# Patient Record
Sex: FEMALE | Race: WHITE
[De-identification: names, ages, dates, MRNs, and addresses within clinical notes are randomized per-mention and may not be internally consistent; named-entity substitution may affect disease eponyms.]

---

## 2019-12-31 ENCOUNTER — HOSPITAL ENCOUNTER (EMERGENCY)
Dept: HOSPITAL 4 - SED | Age: 79
Discharge: HOME | End: 2019-12-31
Payer: COMMERCIAL

## 2019-12-31 VITALS — SYSTOLIC BLOOD PRESSURE: 131 MMHG

## 2019-12-31 VITALS — WEIGHT: 150 LBS | HEIGHT: 64 IN | BODY MASS INDEX: 25.61 KG/M2

## 2019-12-31 VITALS — SYSTOLIC BLOOD PRESSURE: 158 MMHG

## 2019-12-31 DIAGNOSIS — Z90.49: ICD-10-CM

## 2019-12-31 DIAGNOSIS — Z90.710: ICD-10-CM

## 2019-12-31 DIAGNOSIS — R10.32: ICD-10-CM

## 2019-12-31 DIAGNOSIS — R19.7: Primary | ICD-10-CM

## 2019-12-31 DIAGNOSIS — I10: ICD-10-CM

## 2019-12-31 LAB
ALBUMIN SERPL BCP-MCNC: 2.7 G/DL (ref 3.4–4.8)
ALT SERPL W P-5'-P-CCNC: 38 U/L (ref 12–78)
ANION GAP SERPL CALCULATED.3IONS-SCNC: 6 MMOL/L (ref 5–15)
AST SERPL W P-5'-P-CCNC: 24 U/L (ref 10–37)
BASOPHILS # BLD AUTO: 0.1 K/UL (ref 0–0.2)
BASOPHILS NFR BLD AUTO: 0.7 % (ref 0–2)
BILIRUB SERPL-MCNC: 0.9 MG/DL (ref 0–1)
BUN SERPL-MCNC: 18 MG/DL (ref 8–21)
CALCIUM SERPL-MCNC: 8.6 MG/DL (ref 8.4–11)
CHLORIDE SERPL-SCNC: 108 MMOL/L (ref 98–107)
CREAT SERPL-MCNC: 1.37 MG/DL (ref 0.55–1.3)
EOSINOPHIL # BLD AUTO: 0 K/UL (ref 0–0.4)
EOSINOPHIL NFR BLD AUTO: 0.3 % (ref 0–4)
ERYTHROCYTE [DISTWIDTH] IN BLOOD BY AUTOMATED COUNT: 12.8 % (ref 9–15)
GFR SERPL CREATININE-BSD FRML MDRD: (no result) ML/MIN (ref 90–?)
GLUCOSE SERPL-MCNC: 112 MG/DL (ref 70–99)
HCT VFR BLD AUTO: 37.5 % (ref 36–48)
HGB BLD-MCNC: 12.7 G/DL (ref 12–16)
LIPASE SERPL-CCNC: 113 U/L (ref 73–393)
LYMPHOCYTES # BLD AUTO: 2 K/UL (ref 1–5.5)
LYMPHOCYTES NFR BLD AUTO: 17.3 % (ref 20.5–51.5)
MCH RBC QN AUTO: 31 PG (ref 27–31)
MCHC RBC AUTO-ENTMCNC: 34 % (ref 32–36)
MCV RBC AUTO: 92 FL (ref 79–98)
MONOCYTES # BLD MANUAL: 0.9 K/UL (ref 0–1)
MONOCYTES # BLD MANUAL: 8.1 % (ref 1.7–9.3)
NEUTROPHILS # BLD AUTO: 8.4 K/UL (ref 1.8–7.7)
NEUTROPHILS NFR BLD AUTO: 73.6 % (ref 40–70)
PLATELET # BLD AUTO: 106 K/UL (ref 130–430)
POTASSIUM SERPL-SCNC: 3.8 MMOL/L (ref 3.5–5.1)
RBC # BLD AUTO: 4.08 MIL/UL (ref 4.2–6.2)
SODIUM SERPLBLD-SCNC: 138 MMOL/L (ref 136–145)
WBC # BLD AUTO: 11.4 K/UL (ref 4.8–10.8)

## 2019-12-31 PROCEDURE — 36415 COLL VENOUS BLD VENIPUNCTURE: CPT

## 2019-12-31 PROCEDURE — 99284 EMERGENCY DEPT VISIT MOD MDM: CPT

## 2019-12-31 PROCEDURE — 83690 ASSAY OF LIPASE: CPT

## 2019-12-31 PROCEDURE — 74176 CT ABD & PELVIS W/O CONTRAST: CPT

## 2019-12-31 PROCEDURE — 80053 COMPREHEN METABOLIC PANEL: CPT

## 2019-12-31 PROCEDURE — 85025 COMPLETE CBC W/AUTO DIFF WBC: CPT

## 2019-12-31 PROCEDURE — 96361 HYDRATE IV INFUSION ADD-ON: CPT

## 2019-12-31 PROCEDURE — 96374 THER/PROPH/DIAG INJ IV PUSH: CPT

## 2019-12-31 NOTE — NUR
Pt brought by self,A&Ox4,pt presents to ER with abdominal pain and diarrhea x 1 
day,skin pink and warm, cap refill <3, VSS, respirations even and unlabored, no 
vomiting noted, will cont to monitor

## 2019-12-31 NOTE — NUR
Patient and Son given written and verbal discharge instructions and verbalizes 
understanding.  ER MD discussed with patient the results and treatment 
provided. Patient in stable condition. ID arm band removed. IV catheter removed 
intact and dressing applied, no active bleeding.

Rx of immodium, motrin, Cirpo given. Patient educated on pain management and to 
follow up with PMD. Pain Scale 0/10.

Opportunity for questions provided and answered. Medication side effect fact 
sheet provided.

## 2021-04-08 ENCOUNTER — HOSPITAL ENCOUNTER (INPATIENT)
Dept: HOSPITAL 4 - SED | Age: 81
LOS: 2 days | Discharge: HOME HEALTH | DRG: 180 | End: 2021-04-10
Attending: INTERNAL MEDICINE | Admitting: INTERNAL MEDICINE
Payer: COMMERCIAL

## 2021-04-08 VITALS — HEIGHT: 64 IN | BODY MASS INDEX: 26.63 KG/M2 | WEIGHT: 156 LBS

## 2021-04-08 VITALS — SYSTOLIC BLOOD PRESSURE: 169 MMHG

## 2021-04-08 VITALS — SYSTOLIC BLOOD PRESSURE: 170 MMHG

## 2021-04-08 DIAGNOSIS — E03.9: ICD-10-CM

## 2021-04-08 DIAGNOSIS — Z85.118: ICD-10-CM

## 2021-04-08 DIAGNOSIS — Z87.891: ICD-10-CM

## 2021-04-08 DIAGNOSIS — J96.01: ICD-10-CM

## 2021-04-08 DIAGNOSIS — J90: ICD-10-CM

## 2021-04-08 DIAGNOSIS — E78.5: ICD-10-CM

## 2021-04-08 DIAGNOSIS — Z85.3: ICD-10-CM

## 2021-04-08 DIAGNOSIS — Z82.49: ICD-10-CM

## 2021-04-08 DIAGNOSIS — M81.0: ICD-10-CM

## 2021-04-08 DIAGNOSIS — Z86.73: ICD-10-CM

## 2021-04-08 DIAGNOSIS — Z20.822: ICD-10-CM

## 2021-04-08 DIAGNOSIS — I25.10: ICD-10-CM

## 2021-04-08 DIAGNOSIS — C34.90: Primary | ICD-10-CM

## 2021-04-08 DIAGNOSIS — Z79.899: ICD-10-CM

## 2021-04-08 DIAGNOSIS — Z90.13: ICD-10-CM

## 2021-04-08 DIAGNOSIS — I16.1: ICD-10-CM

## 2021-04-08 DIAGNOSIS — I10: ICD-10-CM

## 2021-04-08 LAB
ALBUMIN SERPL BCP-MCNC: 2.8 G/DL (ref 3.4–4.8)
ALT SERPL W P-5'-P-CCNC: 24 U/L (ref 12–78)
ANION GAP SERPL CALCULATED.3IONS-SCNC: 7 MMOL/L (ref 5–15)
AST SERPL W P-5'-P-CCNC: 22 U/L (ref 10–37)
BASOPHILS # BLD AUTO: 0.1 K/UL (ref 0–0.2)
BASOPHILS NFR BLD AUTO: 0.6 % (ref 0–2)
BILIRUB SERPL-MCNC: 0.3 MG/DL (ref 0–1)
BUN SERPL-MCNC: 14 MG/DL (ref 8–21)
CALCIUM SERPL-MCNC: 9.9 MG/DL (ref 8.4–11)
CHLORIDE SERPL-SCNC: 104 MMOL/L (ref 98–107)
CREAT SERPL-MCNC: 1.36 MG/DL (ref 0.55–1.3)
EOSINOPHIL # BLD AUTO: 0.3 K/UL (ref 0–0.4)
EOSINOPHIL NFR BLD AUTO: 3.1 % (ref 0–4)
ERYTHROCYTE [DISTWIDTH] IN BLOOD BY AUTOMATED COUNT: 13 % (ref 9–15)
GFR SERPL CREATININE-BSD FRML MDRD: (no result) ML/MIN (ref 90–?)
GLUCOSE SERPL-MCNC: 110 MG/DL (ref 70–99)
HCT VFR BLD AUTO: 37.1 % (ref 36–48)
HGB BLD-MCNC: 12.6 G/DL (ref 12–16)
INR PPP: 1 (ref 0.8–1.2)
LYMPHOCYTES # BLD AUTO: 2 K/UL (ref 1–5.5)
LYMPHOCYTES NFR BLD AUTO: 23.2 % (ref 20.5–51.5)
MCH RBC QN AUTO: 31 PG (ref 27–31)
MCHC RBC AUTO-ENTMCNC: 34 % (ref 32–36)
MCV RBC AUTO: 90 FL (ref 79–98)
MONOCYTES # BLD MANUAL: 0.6 K/UL (ref 0–1)
MONOCYTES # BLD MANUAL: 7.1 % (ref 1.7–9.3)
NEUTROPHILS # BLD AUTO: 5.8 K/UL (ref 1.8–7.7)
NEUTROPHILS NFR BLD AUTO: 66 % (ref 40–70)
PLATELET # BLD AUTO: 231 K/UL (ref 130–430)
POTASSIUM SERPL-SCNC: 4.7 MMOL/L (ref 3.5–5.1)
PROTHROMBIN TIME: 10 SECS (ref 9.5–12.5)
RBC # BLD AUTO: 4.12 MIL/UL (ref 4.2–6.2)
SODIUM SERPLBLD-SCNC: 137 MMOL/L (ref 136–145)
WBC # BLD AUTO: 8.7 K/UL (ref 4.8–10.8)

## 2021-04-08 PROCEDURE — G0378 HOSPITAL OBSERVATION PER HR: HCPCS

## 2021-04-08 RX ADMIN — CEFTRIAXONE SODIUM SCH MLS/HR: 1 INJECTION, SOLUTION INTRAVENOUS at 21:38

## 2021-04-08 RX ADMIN — SODIUM CHLORIDE SCH MLS/HR: 0.9 INJECTION, SOLUTION INTRAVENOUS at 23:21

## 2021-04-08 RX ADMIN — GABAPENTIN SCH MG: 100 CAPSULE ORAL at 21:37

## 2021-04-08 RX ADMIN — ATORVASTATIN CALCIUM SCH MG: 20 TABLET, FILM COATED ORAL at 21:37

## 2021-04-09 VITALS — SYSTOLIC BLOOD PRESSURE: 146 MMHG

## 2021-04-09 VITALS — SYSTOLIC BLOOD PRESSURE: 172 MMHG

## 2021-04-09 VITALS — SYSTOLIC BLOOD PRESSURE: 175 MMHG

## 2021-04-09 VITALS — SYSTOLIC BLOOD PRESSURE: 166 MMHG

## 2021-04-09 VITALS — SYSTOLIC BLOOD PRESSURE: 167 MMHG

## 2021-04-09 LAB
ALBUMIN SERPL BCP-MCNC: 2.5 G/DL (ref 3.4–4.8)
ALT SERPL W P-5'-P-CCNC: 20 U/L (ref 12–78)
ANION GAP SERPL CALCULATED.3IONS-SCNC: 9 MMOL/L (ref 5–15)
APPEARANCE UR: CLEAR
AST SERPL W P-5'-P-CCNC: 19 U/L (ref 10–37)
BACTERIA URNS QL MICRO: (no result) /HPF
BASOPHILS # BLD AUTO: 0.1 K/UL (ref 0–0.2)
BASOPHILS NFR BLD AUTO: 0.7 % (ref 0–2)
BILIRUB SERPL-MCNC: 0.2 MG/DL (ref 0–1)
BILIRUB UR QL STRIP: NEGATIVE
BUN SERPL-MCNC: 16 MG/DL (ref 8–21)
CALCIUM SERPL-MCNC: 9.5 MG/DL (ref 8.4–11)
CHLORIDE SERPL-SCNC: 106 MMOL/L (ref 98–107)
COLOR UR: YELLOW
CREAT SERPL-MCNC: 1.33 MG/DL (ref 0.55–1.3)
EOSINOPHIL # BLD AUTO: 0.1 K/UL (ref 0–0.4)
EOSINOPHIL NFR BLD AUTO: 0.9 % (ref 0–4)
ERYTHROCYTE [DISTWIDTH] IN BLOOD BY AUTOMATED COUNT: 12.7 % (ref 9–15)
GFR SERPL CREATININE-BSD FRML MDRD: (no result) ML/MIN (ref 90–?)
GLUCOSE FLD-MCNC: 122 MG/DL
GLUCOSE SERPL-MCNC: 103 MG/DL (ref 70–99)
GLUCOSE UR STRIP-MCNC: NEGATIVE MG/DL
HCT VFR BLD AUTO: 35.1 % (ref 36–48)
HGB BLD-MCNC: 11.8 G/DL (ref 12–16)
HGB UR QL STRIP: NEGATIVE
KETONES UR STRIP-MCNC: NEGATIVE MG/DL
LEUKOCYTE ESTERASE UR QL STRIP: NEGATIVE
LYMPHOCYTES # BLD AUTO: 1.6 K/UL (ref 1–5.5)
LYMPHOCYTES NFR BLD AUTO: 18.6 % (ref 20.5–51.5)
MCH RBC QN AUTO: 31 PG (ref 27–31)
MCHC RBC AUTO-ENTMCNC: 34 % (ref 32–36)
MCV RBC AUTO: 91 FL (ref 79–98)
MONOCYTES # BLD MANUAL: 0.8 K/UL (ref 0–1)
MONOCYTES # BLD MANUAL: 8.7 % (ref 1.7–9.3)
NEUTROPHILS # BLD AUTO: 6.2 K/UL (ref 1.8–7.7)
NEUTROPHILS NFR BLD AUTO: 71.1 % (ref 40–70)
NITRITE UR QL STRIP: NEGATIVE
PH UR STRIP: 6 [PH] (ref 5–8)
PLATELET # BLD AUTO: 232 K/UL (ref 130–430)
POTASSIUM SERPL-SCNC: 5 MMOL/L (ref 3.5–5.1)
PROT FLD-MCNC: 3.9 G/DL
PROT UR QL STRIP: (no result)
RBC # BLD AUTO: 3.86 MIL/UL (ref 4.2–6.2)
RBC #/AREA URNS HPF: (no result) /HPF (ref 0–3)
SODIUM SERPLBLD-SCNC: 139 MMOL/L (ref 136–145)
SP GR UR STRIP: 1.01 (ref 1–1.03)
UROBILINOGEN UR STRIP-MCNC: 0.2 MG/DL (ref 0.2–1)
WBC # BLD AUTO: 8.7 K/UL (ref 4.8–10.8)
WBC #/AREA URNS HPF: (no result) /HPF (ref 0–3)

## 2021-04-09 PROCEDURE — 0W993ZZ DRAINAGE OF RIGHT PLEURAL CAVITY, PERCUTANEOUS APPROACH: ICD-10-PCS | Performed by: INTERNAL MEDICINE

## 2021-04-09 RX ADMIN — CLOPIDOGREL BISULFATE SCH MG: 75 TABLET, FILM COATED ORAL at 08:38

## 2021-04-09 RX ADMIN — METOPROLOL SUCCINATE SCH MG: 50 TABLET, EXTENDED RELEASE ORAL at 08:39

## 2021-04-09 RX ADMIN — GABAPENTIN SCH MG: 100 CAPSULE ORAL at 20:05

## 2021-04-09 RX ADMIN — LOSARTAN POTASSIUM SCH MG: 25 TABLET, FILM COATED ORAL at 08:40

## 2021-04-09 RX ADMIN — ATORVASTATIN CALCIUM SCH MG: 20 TABLET, FILM COATED ORAL at 20:05

## 2021-04-09 RX ADMIN — CEFTRIAXONE SODIUM SCH MLS/HR: 1 INJECTION, SOLUTION INTRAVENOUS at 20:05

## 2021-04-09 RX ADMIN — SODIUM CHLORIDE SCH MLS/HR: 0.9 INJECTION, SOLUTION INTRAVENOUS at 20:56

## 2021-04-09 RX ADMIN — LEVOTHYROXINE SODIUM SCH MG: 75 TABLET ORAL at 06:49

## 2021-04-10 VITALS — SYSTOLIC BLOOD PRESSURE: 136 MMHG

## 2021-04-10 VITALS — SYSTOLIC BLOOD PRESSURE: 143 MMHG

## 2021-04-10 VITALS — SYSTOLIC BLOOD PRESSURE: 134 MMHG

## 2021-04-10 LAB — LDH FLD-CCNC: 180 IU/L

## 2021-04-10 RX ADMIN — LOSARTAN POTASSIUM SCH MG: 25 TABLET, FILM COATED ORAL at 08:51

## 2021-04-10 RX ADMIN — CLOPIDOGREL BISULFATE SCH MG: 75 TABLET, FILM COATED ORAL at 08:52

## 2021-04-10 RX ADMIN — LEVOTHYROXINE SODIUM SCH MG: 75 TABLET ORAL at 06:09

## 2021-04-10 RX ADMIN — METOPROLOL SUCCINATE SCH MG: 50 TABLET, EXTENDED RELEASE ORAL at 08:57

## 2021-04-11 LAB — PH, BODY FLUID: >7
